# Patient Record
Sex: FEMALE
[De-identification: names, ages, dates, MRNs, and addresses within clinical notes are randomized per-mention and may not be internally consistent; named-entity substitution may affect disease eponyms.]

---

## 2019-07-23 ENCOUNTER — HOSPITAL ENCOUNTER (EMERGENCY)
Dept: HOSPITAL 18 - NAV ERS | Age: 39
Discharge: ANOTHER HOSPITAL | End: 2019-07-23
Payer: SELF-PAY

## 2019-07-23 ENCOUNTER — HOSPITAL ENCOUNTER (INPATIENT)
Dept: HOSPITAL 92 - ERS | Age: 39
LOS: 5 days | Discharge: HOME | DRG: 770 | End: 2019-07-28
Attending: OBSTETRICS & GYNECOLOGY | Admitting: OBSTETRICS & GYNECOLOGY
Payer: SELF-PAY

## 2019-07-23 VITALS — BODY MASS INDEX: 23.8 KG/M2

## 2019-07-23 DIAGNOSIS — D64.9: ICD-10-CM

## 2019-07-23 DIAGNOSIS — E83.42: ICD-10-CM

## 2019-07-23 DIAGNOSIS — O98.811: Primary | ICD-10-CM

## 2019-07-23 DIAGNOSIS — R65.21: ICD-10-CM

## 2019-07-23 DIAGNOSIS — A41.9: ICD-10-CM

## 2019-07-23 DIAGNOSIS — Z3A.01: ICD-10-CM

## 2019-07-23 DIAGNOSIS — O99.89: ICD-10-CM

## 2019-07-23 DIAGNOSIS — O07.37: Primary | ICD-10-CM

## 2019-07-23 DIAGNOSIS — R94.31: ICD-10-CM

## 2019-07-23 DIAGNOSIS — E87.6: ICD-10-CM

## 2019-07-23 DIAGNOSIS — R10.9: ICD-10-CM

## 2019-07-23 DIAGNOSIS — J96.01: ICD-10-CM

## 2019-07-23 LAB
ALBUMIN SERPL BCG-MCNC: 4.4 G/DL (ref 3.5–5)
ALP SERPL-CCNC: 67 U/L (ref 40–150)
ALT SERPL W P-5'-P-CCNC: 20 U/L (ref 8–55)
ANALYZER IN CARDIO: (no result)
ANION GAP SERPL CALC-SCNC: 23 MMOL/L (ref 10–20)
APAP SERPL-MCNC: (no result) MCG/ML (ref 10–30)
APTT PPP: 38.3 SEC (ref 22.9–36.1)
APTT PPP: 38.6 SEC (ref 22.9–36.1)
AST SERPL-CCNC: 24 U/L (ref 5–34)
BACTERIA UR QL AUTO: (no result) HPF
BASE EXCESS STD BLDA CALC-SCNC: -12.8 MEQ/L
BASOPHILS # BLD AUTO: 0 THOU/UL (ref 0–0.2)
BASOPHILS # BLD AUTO: 0 THOU/UL (ref 0–0.2)
BASOPHILS NFR BLD AUTO: 0.3 % (ref 0–1)
BASOPHILS NFR BLD AUTO: 1 % (ref 0–1)
BILIRUB SERPL-MCNC: 0.7 MG/DL (ref 0.2–1.2)
BUN SERPL-MCNC: 5 MG/DL (ref 7–18.7)
CA-I BLDA-SCNC: 1.05 MMOL/L (ref 1.12–1.3)
CALCIUM SERPL-MCNC: 9.4 MG/DL (ref 7.8–10.44)
CHLORIDE SERPL-SCNC: 104 MMOL/L (ref 98–107)
CK SERPL-CCNC: 56 U/L (ref 29–168)
CO2 SERPL-SCNC: 13 MMOL/L (ref 22–29)
CREAT CL PREDICTED SERPL C-G-VRATE: 0 ML/MIN (ref 70–130)
DRUG SCREEN CUTOFF: (no result)
EOSINOPHIL # BLD AUTO: 0 THOU/UL (ref 0–0.7)
EOSINOPHIL # BLD AUTO: 0 THOU/UL (ref 0–0.7)
EOSINOPHIL NFR BLD AUTO: 0.1 % (ref 0–10)
EOSINOPHIL NFR BLD AUTO: 0.1 % (ref 0–10)
GLOBULIN SER CALC-MCNC: 4.1 G/DL (ref 2.4–3.5)
GLUCOSE SERPL-MCNC: 105 MG/DL (ref 70–105)
HCG, TOTAL QUANT: (no result) MIU/ML
HCO3 BLDA-SCNC: 13.8 MEQ/L (ref 22–28)
HCT VFR BLDA CALC: 28 % (ref 36–47)
HGB BLD-MCNC: 11.3 G/DL (ref 12–16)
HGB BLD-MCNC: 8.5 G/DL (ref 12–16)
HGB BLDA-MCNC: 9.6 G/DL (ref 12–16)
INR PPP: 1.5
INR PPP: 1.5
LYMPHOCYTES # BLD AUTO: 1 THOU/UL (ref 1.2–3.4)
LYMPHOCYTES # BLD: 0.3 THOU/UL (ref 1.2–3.4)
LYMPHOCYTES NFR BLD AUTO: 29.5 % (ref 21–51)
LYMPHOCYTES NFR BLD AUTO: 7.3 % (ref 21–51)
MCH RBC QN AUTO: 26.7 PG (ref 27–31)
MCH RBC QN AUTO: 28.1 PG (ref 27–31)
MCV RBC AUTO: 88.1 FL (ref 78–98)
MCV RBC AUTO: 88.6 FL (ref 78–98)
MDIFF COMPLETE?: YES
MEDTOX CONTROL LINE VALID?: (no result)
MONOCYTES # BLD AUTO: 0 THOU/UL (ref 0.11–0.59)
MONOCYTES # BLD AUTO: 0.1 THOU/UL (ref 0.11–0.59)
MONOCYTES NFR BLD AUTO: 1.3 % (ref 0–10)
MONOCYTES NFR BLD AUTO: 1.9 % (ref 0–10)
NEUTROPHILS # BLD AUTO: 2.3 THOU/UL (ref 1.4–6.5)
NEUTROPHILS # BLD AUTO: 4.1 THOU/UL (ref 1.4–6.5)
NEUTROPHILS NFR BLD AUTO: 68.1 % (ref 42–75)
NEUTROPHILS NFR BLD AUTO: 90.5 % (ref 42–75)
PCO2 BLDA: 34.2 MMHG (ref 35–45)
PH BLDA: 7.22 [PH] (ref 7.35–7.45)
PLATELET # BLD AUTO: 115 THOU/UL (ref 130–400)
PLATELET # BLD AUTO: 136 THOU/UL (ref 130–400)
PO2 BLDA: 103.8 MMHG (ref 80–100)
POLYCHROMASIA BLD QL SMEAR: (no result) (100X)
POTASSIUM BLD-SCNC: 2.67 MMOL/L (ref 3.7–5.3)
POTASSIUM SERPL-SCNC: 4.3 MMOL/L (ref 3.5–5.1)
PROT UR STRIP.AUTO-MCNC: (no result) MG/DL
PROTHROMBIN TIME: 17.9 SEC (ref 12–14.7)
PROTHROMBIN TIME: 18 SEC (ref 12–14.7)
RBC # BLD AUTO: 3.01 MILL/UL (ref 4.2–5.4)
RBC # BLD AUTO: 4.25 MILL/UL (ref 4.2–5.4)
SALICYLATES SERPL-MCNC: (no result) MG/DL (ref 15–30)
SODIUM SERPL-SCNC: 136 MMOL/L (ref 136–145)
SPECIMEN DRAWN FROM PATIENT: (no result)
TSH SERPL DL<=0.005 MIU/L-ACNC: 0.55 UIU/ML (ref 0.35–4.94)
WBC # BLD AUTO: 3.4 THOU/UL (ref 4.8–10.8)
WBC # BLD AUTO: 4.5 THOU/UL (ref 4.8–10.8)

## 2019-07-23 PROCEDURE — 86850 RBC ANTIBODY SCREEN: CPT

## 2019-07-23 PROCEDURE — S0028 INJECTION, FAMOTIDINE, 20 MG: HCPCS

## 2019-07-23 PROCEDURE — 80307 DRUG TEST PRSMV CHEM ANLYZR: CPT

## 2019-07-23 PROCEDURE — 86901 BLOOD TYPING SEROLOGIC RH(D): CPT

## 2019-07-23 PROCEDURE — 71045 X-RAY EXAM CHEST 1 VIEW: CPT

## 2019-07-23 PROCEDURE — 86900 BLOOD TYPING SEROLOGIC ABO: CPT

## 2019-07-23 PROCEDURE — 87040 BLOOD CULTURE FOR BACTERIA: CPT

## 2019-07-23 PROCEDURE — 96361 HYDRATE IV INFUSION ADD-ON: CPT

## 2019-07-23 PROCEDURE — 85730 THROMBOPLASTIN TIME PARTIAL: CPT

## 2019-07-23 PROCEDURE — 84702 CHORIONIC GONADOTROPIN TEST: CPT

## 2019-07-23 PROCEDURE — 81003 URINALYSIS AUTO W/O SCOPE: CPT

## 2019-07-23 PROCEDURE — 31500 INSERT EMERGENCY AIRWAY: CPT

## 2019-07-23 PROCEDURE — 82805 BLOOD GASES W/O2 SATURATION: CPT

## 2019-07-23 PROCEDURE — 96360 HYDRATION IV INFUSION INIT: CPT

## 2019-07-23 PROCEDURE — 80306 DRUG TEST PRSMV INSTRMNT: CPT

## 2019-07-23 PROCEDURE — 80053 COMPREHEN METABOLIC PANEL: CPT

## 2019-07-23 PROCEDURE — 36415 COLL VENOUS BLD VENIPUNCTURE: CPT

## 2019-07-23 PROCEDURE — 83605 ASSAY OF LACTIC ACID: CPT

## 2019-07-23 PROCEDURE — 84484 ASSAY OF TROPONIN QUANT: CPT

## 2019-07-23 PROCEDURE — 94003 VENT MGMT INPAT SUBQ DAY: CPT

## 2019-07-23 PROCEDURE — 85025 COMPLETE CBC W/AUTO DIFF WBC: CPT

## 2019-07-23 PROCEDURE — 81015 MICROSCOPIC EXAM OF URINE: CPT

## 2019-07-23 PROCEDURE — 85610 PROTHROMBIN TIME: CPT

## 2019-07-23 PROCEDURE — 93005 ELECTROCARDIOGRAM TRACING: CPT

## 2019-07-23 PROCEDURE — 96365 THER/PROPH/DIAG IV INF INIT: CPT

## 2019-07-23 PROCEDURE — 82550 ASSAY OF CK (CPK): CPT

## 2019-07-23 PROCEDURE — 87086 URINE CULTURE/COLONY COUNT: CPT

## 2019-07-23 PROCEDURE — 94760 N-INVAS EAR/PLS OXIMETRY 1: CPT

## 2019-07-23 PROCEDURE — 88305 TISSUE EXAM BY PATHOLOGIST: CPT

## 2019-07-23 PROCEDURE — 83735 ASSAY OF MAGNESIUM: CPT

## 2019-07-23 PROCEDURE — 80048 BASIC METABOLIC PNL TOTAL CA: CPT

## 2019-07-23 PROCEDURE — 36556 INSERT NON-TUNNEL CV CATH: CPT

## 2019-07-23 PROCEDURE — 84443 ASSAY THYROID STIM HORMONE: CPT

## 2019-07-23 PROCEDURE — 87149 DNA/RNA DIRECT PROBE: CPT

## 2019-07-23 PROCEDURE — 94002 VENT MGMT INPAT INIT DAY: CPT

## 2019-07-23 PROCEDURE — 81001 URINALYSIS AUTO W/SCOPE: CPT

## 2019-07-23 PROCEDURE — 10D17ZZ EXTRACTION OF PRODUCTS OF CONCEPTION, RETAINED, VIA NATURAL OR ARTIFICIAL OPENING: ICD-10-PCS | Performed by: OBSTETRICS & GYNECOLOGY

## 2019-07-23 NOTE — PDOC.EVN
Event Note





- Event Note


Event Note: 





Passed by ICU to see Ms Hyde about 1-2hrs after post surgery. Pt stable on 

ivf. BP 130s systolic. Pulse 90s. Explained to family pt may be extubated in 

the morning if she continues to improve.  I have discussed case with Dr Davies 

who is managing her care tonight.  Thanks for the help. 





addendum


7/24/19 0751


PT did well overnight and has remained stable only with mainenance iv fluids 

and abx. Pt is still intubated. SEdation decreased and pt able to communicate.  

She has confirmed family does not know she was pregnant and does not want us to 

share that with them.  





vital signs reviewed and stable and wnl





a/p


septic AB- on Zosyn, BC pending. Clinically looking much better since 

presentation.  likely extubation and transfer to floor today. DR Davies 

managing vent.

## 2019-07-23 NOTE — RAD
EXAM: Portable supine chest



INDICATIONS: Postintubation



COMPARISON: None.



FINDINGS: ET tube is above stanton and appears adequately positioned. NG tube appears in adequate posi
tion. Central line has tip overlying right atrium.

Borderline cardiomegaly and mild vascular congestion. No consolidation or focal infiltrate.



IMPRESSION: Tubes and lines appear adequately positioned. Mild vascular congestion.



Reported By: Garrett Haddad 

Electronically Signed:  7/23/2019 7:48 PM

## 2019-07-23 NOTE — OP
DATE OF PROCEDURE:  2019



PREOPERATIVE DIAGNOSES:  

1. Septic .

2. IUP at about 6 weeks gestation.



POSTOPERATIVE DIAGNOSES:  

1. Septic .

2. IUP at about 6 weeks gestation.



PROCEDURE PERFORMED:  Suction D and C.



ANESTHESIA:  General.



COMPLICATIONS:  None.



CONDITION:  Stable.



COUNT:  Correct.



SPECIMENS:  Products of conception.



ESTIMATED BLOOD LOSS:  About 100 mL.



FINDINGS:  Uterine sound at 11 cm, generous return with suction D and C and good

hemostasis at conclusion of procedure. 



DESCRIPTION OF PROCEDURE:  The patient was taken to the operating room already

intubated and central line in place, which was performed in the emergency room given

the patient's overall status at that time.  She was then prepared and draped in

normal sterile fashion in a dorsal lithotomy position in candy-cane stirrups.

Attention was placed vaginally, where with an operative speculum, the cervix was

identified and grasped with a single-tooth tenaculum and the uterus was sounded to

11 cm.  Cervix was dilated to accommodate a 7-Maori curved curette.  After about 3

or 4 passes with generous return, the serrated sharp curette was then passed noting

uterine cry in all 4 quadrants.  A suction curette was then passed one more time to

remove all remaining debris.  The patient did have some bleeding which resolved with

bimanual pressure.  The tenaculum sites were made hemostatic with sponge stick.

With the conclusion of the procedure, the patient was taken out of lithotomy

position and then transferred to the ICU for immediate postoperative care.

Anticipate extubation in the morning. 







Job ID:  [id number]

## 2019-07-24 LAB
ANION GAP SERPL CALC-SCNC: 9 MMOL/L (ref 10–20)
BASOPHILS # BLD AUTO: 0.1 THOU/UL (ref 0–0.2)
BASOPHILS NFR BLD AUTO: 1 % (ref 0–1)
BUN SERPL-MCNC: 4 MG/DL (ref 7–18.7)
CALCIUM SERPL-MCNC: 7.4 MG/DL (ref 7.8–10.44)
CHLORIDE SERPL-SCNC: 112 MMOL/L (ref 98–107)
CO2 SERPL-SCNC: 17 MMOL/L (ref 22–29)
CREAT CL PREDICTED SERPL C-G-VRATE: 142 ML/MIN (ref 70–130)
EOSINOPHIL # BLD AUTO: 0 THOU/UL (ref 0–0.7)
EOSINOPHIL NFR BLD AUTO: 0.3 % (ref 0–10)
GLUCOSE SERPL-MCNC: 99 MG/DL (ref 70–105)
HGB BLD-MCNC: 8.4 G/DL (ref 12–16)
LYMPHOCYTES # BLD: 1.7 THOU/UL (ref 1.2–3.4)
LYMPHOCYTES NFR BLD AUTO: 23.7 % (ref 21–51)
MAGNESIUM SERPL-MCNC: 1.6 MG/DL (ref 1.6–2.6)
MCH RBC QN AUTO: 28.8 PG (ref 27–31)
MCV RBC AUTO: 87 FL (ref 78–98)
MONOCYTES # BLD AUTO: 0.6 THOU/UL (ref 0.11–0.59)
MONOCYTES NFR BLD AUTO: 9.2 % (ref 0–10)
NEUTROPHILS # BLD AUTO: 4.6 THOU/UL (ref 1.4–6.5)
NEUTROPHILS NFR BLD AUTO: 65.8 % (ref 42–75)
PLATELET # BLD AUTO: 102 THOU/UL (ref 130–400)
POTASSIUM SERPL-SCNC: 2.5 MMOL/L (ref 3.5–5.1)
RBC # BLD AUTO: 2.93 MILL/UL (ref 4.2–5.4)
SODIUM SERPL-SCNC: 135 MMOL/L (ref 136–145)
WBC # BLD AUTO: 7 THOU/UL (ref 4.8–10.8)
WBC UR QL AUTO: (no result) HPF (ref 0–3)

## 2019-07-24 RX ADMIN — HEPARIN SODIUM SCH: 5000 INJECTION, SOLUTION INTRAVENOUS; SUBCUTANEOUS at 19:44

## 2019-07-24 NOTE — CON
DATE OF CONSULTATION:  2019



SERVICE:  Pulmonary/Critical Care.



REASON FOR CONSULTATION:  ICU patient.



HISTORY OF PRESENT ILLNESS:  The patient is a 39-year-old  female with 
past

medical history significant for pregnancy that she did not want to keep.

Apparently, the story goes that she attempted to give herself an .  She

became septic.  As such, she was transferred from one Emergency Department to

another Emergency Department for an emergency D and C procedure.  She was

hypotensive on presentation, got 4 L of fluid.  A central line was placed, she 
was

initiated on pressors.  Overnight, she cleared her lactate.  This morning, she 
is

breathing comfortably.  She cannot provide any additional elements of the 
history

because the tube is currently in her throat.  We know very little history about 
her. 



PAST MEDICAL HISTORY:  None.



PAST SURGICAL HISTORY:  

1.  section.

2. D and C.



SOCIAL HISTORY:  Unknown.



FAMILY HISTORY:  Noncontributory.



ALLERGIES:  NO KNOWN DRUG ALLERGIES.



MEDICATIONS:  List of her inpatient medications was reviewed and updated.



REVIEW OF SYSTEMS:  This cannot be obtained as the patient is currently 
intubated on

the influence of some sedation. 



PHYSICAL EXAMINATION:

VITAL SIGNS:  Afebrile, pulse 85, blood pressure 87/60, respirations 20, and

saturation 99% on 21% FiO2 and a PEEP of 5. 

HEENT:  Normocephalic and atraumatic.  Sclerae white.  Conjunctivae pink.  Oral

mucosa is moist without lesions. 

LUNGS:  Decent air entry.  No prolonged expiratory phase or wheezing is 
appreciated. 

HEART:  Normal rate.  Regular. 

ABDOMEN:  Soft, nontender, and nondistended.  Bowel sounds positive. 

MUSCULOSKELETAL:  No cyanosis or clubbing.  There is no pitting in the bilateral

lower extremities. 

NEUROLOGIC:  Nonfocal.



LABORATORY DATA:  WBC 4.5, hemoglobin 8.5, and platelets 115,000.  INR 1.5.  A 
pH of

7.22, pCO2 of 34, and pO2 of 104.  Lactate had improved to 0.8.  Blood cultures 
x2

are unremarkable. 



ASSESSMENT:  

1. Septic shock.

2. Septic , status post D and C, postop day #1.



DISCUSSION AND PLAN:  The patient will be placed on a spontaneous breathing 
trial

after long sedation holiday.  If she meets criteria, extubation will be 
considered.

Her ongoing acidosis is likely a function of her aggressive volume resuscitation
,

which she needed.  At this point; however, she does not need any IV fluids.  If 
we

extubate her, we will get her out of bed into a chair, and offer her a diet.  I 
will

repeat some laboratories tomorrow morning to make certain that her bicarb 
returns to

the normal range.  Pulmonary/Critical Care will follow along for the time 
being. 



Critical care time: 30 minutes.  



Job ID:  [id number]



MTDD

## 2019-07-25 LAB
ANION GAP SERPL CALC-SCNC: 8 MMOL/L (ref 10–20)
ANION GAP SERPL CALC-SCNC: 8 MMOL/L (ref 10–20)
BASOPHILS # BLD AUTO: 0.1 THOU/UL (ref 0–0.2)
BASOPHILS NFR BLD AUTO: 0.5 % (ref 0–1)
BUN SERPL-MCNC: (no result) MG/DL (ref 7–18.7)
CALCIUM SERPL-MCNC: 7.9 MG/DL (ref 7.8–10.44)
CHLORIDE SERPL-SCNC: 111 MMOL/L (ref 98–107)
CHLORIDE SERPL-SCNC: 112 MMOL/L (ref 98–107)
CO2 SERPL-SCNC: 19 MMOL/L (ref 22–29)
CO2 SERPL-SCNC: 19 MMOL/L (ref 22–29)
CREAT CL PREDICTED SERPL C-G-VRATE: 140 ML/MIN (ref 70–130)
EOSINOPHIL # BLD AUTO: 0 THOU/UL (ref 0–0.7)
EOSINOPHIL NFR BLD AUTO: 0.3 % (ref 0–10)
GLUCOSE SERPL-MCNC: 100 MG/DL (ref 70–105)
HGB BLD-MCNC: 8.8 G/DL (ref 12–16)
LYMPHOCYTES # BLD: 1.3 THOU/UL (ref 1.2–3.4)
LYMPHOCYTES NFR BLD AUTO: 13 % (ref 21–51)
MCH RBC QN AUTO: 29.1 PG (ref 27–31)
MCV RBC AUTO: 86 FL (ref 78–98)
MONOCYTES # BLD AUTO: 0.6 THOU/UL (ref 0.11–0.59)
MONOCYTES NFR BLD AUTO: 5.7 % (ref 0–10)
NEUTROPHILS # BLD AUTO: 8.2 THOU/UL (ref 1.4–6.5)
NEUTROPHILS NFR BLD AUTO: 80.5 % (ref 42–75)
PLATELET # BLD AUTO: 112 THOU/UL (ref 130–400)
POTASSIUM SERPL-SCNC: 2.8 MMOL/L (ref 3.5–5.1)
POTASSIUM SERPL-SCNC: 2.9 MMOL/L (ref 3.5–5.1)
RBC # BLD AUTO: 3.04 MILL/UL (ref 4.2–5.4)
SODIUM SERPL-SCNC: 135 MMOL/L (ref 136–145)
SODIUM SERPL-SCNC: 136 MMOL/L (ref 136–145)
WBC # BLD AUTO: 10.1 THOU/UL (ref 4.8–10.8)

## 2019-07-25 RX ADMIN — HEPARIN SODIUM SCH UNITS: 5000 INJECTION, SOLUTION INTRAVENOUS; SUBCUTANEOUS at 20:56

## 2019-07-25 RX ADMIN — HEPARIN SODIUM SCH UNITS: 5000 INJECTION, SOLUTION INTRAVENOUS; SUBCUTANEOUS at 08:08

## 2019-07-25 RX ADMIN — FAMOTIDINE SCH MG: 10 INJECTION, SOLUTION INTRAVENOUS at 08:08

## 2019-07-25 RX ADMIN — FAMOTIDINE SCH MG: 10 INJECTION, SOLUTION INTRAVENOUS at 20:56

## 2019-07-25 NOTE — CON
DATE OF CONSULTATION:  2019



REFERRING PHYSICIAN:  Dr. Davies



REASON FOR CONSULTATION:  Medical management.



HISTORY OF PRESENT ILLNESS:  Ms. Hyde is a 39-year-old woman, who presented 
to

the emergency department on 2019 as a transfer from [place] due to 
sepsis

following an attempt on self  via insertion of surgical tubing and 
drinking

marked amount of castor oil.  The patient was approximately 6 weeks pregnant.  
She

presented to [place] with a temperature of 104.4.  The patient was noted to be

hypotensive with a systolic blood pressure in the 90s.  Per flight crew report, 
she

had a saturation of 90% on 2 L and 94% on room air.  She was given IV 
antibiotics

including Zosyn and Flagyl as well as IV fluids.  On arrival to the emergency

department here, the patient had been complaining of abdominal pain.  The 
patient

ended up being intubated while in the emergency department and was admitted to 
the

CCU with septic shock.  She was seen by Dr. Chaves, who performed a D and C.  
She

remained stable on IV fluids and her blood pressure improved to the 130s.  The

patient was extubated yesterday.  Per Dr. Chaves, she felt to be improving and

remained for extubation and transfer to the floor today.  The patient was being

managed by Dr. Davies, who placed the patient on a spontaneous breathing trial

after a long sedation.  He felt her ongoing acidosis was likely due to 
aggressive

volume repletion.  IV fluids were no longer deemed necessary, therefore, those 
have

been discontinued.  He continues to follow.  The patient has now been 
transferred to

the floor and is doing well.  She denies any complaints at this present time.

Denies any pain.  Denies any chest pain or difficulty breathing.  Has not

experienced any vaginal bleeding.  She has tolerated fluid intake, but has had

minimal food intake due to lack of appetite.  The patient states she has been

mobilizing to the toilet, but feeling lightheaded. 



No laboratory studies done since yesterday.  The patient remains afebrile.  Her

blood pressure is in the high 80s to 90s. 



PAST MEDICAL HISTORY:  None.



PAST SURGICAL HISTORY:  

1. Previous .

2. Status post D and C due to septic .



SOCIAL HISTORY:  Denies any tobacco use or drug use.  No alcohol use.



ALLERGIES:  NONE.



CURRENT MEDICATIONS:  None.



PHYSICAL EXAMINATION:

GENERAL:  The patient appears well developed, well nourished, is in no acute

distress. 

VITAL SIGNS:  Temperature 98.4, pulse 91, respirations 16, O2 saturation 100 on 
room

air, blood pressure 97/55.  HEENT:  Normocephalic and atraumatic.  Pupils are 
equal,

round, and reactive to light.  Sclerae icterus.  Oropharynx is clear. 

NECK:  Supple. 

LUNGS:  Clear to auscultation bilaterally. 

CARDIAC:  Regular rate and rhythm. 

ABDOMEN:  Soft, nontender, nondistended.  No guarding or rigidity. 

EXTREMITIES:  No lower leg swelling or edema.  Mechanical SCDs in place. 

NEUROLOGIC:  Alert and oriented x3. 

SKIN:  The patient appears pale, but skin is warm and dry.



IMPRESSION AND PLAN:  Ms. Hyde is a 39-year-old woman, who is admitted with

septic shock following self attempted incomplete .  She is status post

extubation and cleared for transfer to the floor.  She remains hypotensive and

symptomatic.  We will resume IV fluids and we will also obtain CBC and BMP as 
well

as lactic acid.  We will request orthostatic blood pressure and urinalysis.  The

patient has no medical comorbidities.  She is currently without any complaints.
  She

was encouraged to increase her oral intake as tolerated.  We will check 
orthostatic

blood pressures.  Last hemoglobin checked yesterday was 8.5, we will transfuse 
if

needed. 



Case discussed with attending, who agrees with plan of care as described above.







Job ID:  [id number]



MTDD

## 2019-07-25 NOTE — PDOC.EVN
Event Note





- Event Note


Event Note: 


POD #2 s/p emergent D&C for septic . Pt extubated and stable out of ICU 

now for one day.  


Pt reports she is feeling okay. She is very grateful for everyones care.  About 

to get up to shower. denies pain. 


vital signs 99.7 tmax 101.4 this am 0400.  bp 90/57  p 98  rr 20 


PE: NAD


abdomen soft NTTP





k. 2.8


mg 1.6





A/P 


1/ septic  s/p D&C stable on zosyn.  bcx neg to date.  febrile in the 

last 24hrs. will continue abx


2. hypo K- PT has recieved 80meq today by IM . Will order another 40meq now and 

and other 40 in the AM. with meal .  will recheck in morning


3. hypo mag. 2gm supplemented by Dr Davies. Will check in AM


4. At pt request family has not been informed of the nature of her illness.


Continue in house care and reassess in the next couple of days.


Regular diet


DVT prophylaxis scd'd for 


GI prophylaxis pepcid








addendum labs this am k 3.5 mag 2.2

## 2019-07-25 NOTE — PRG
DATE OF SERVICE:  07/25/2019



SERVICE:  Pulmonary Medicine.



INTERVAL HISTORY:  The patient is doing fine from respiratory standpoint.  She is

breathing comfortably.  She has no complaints of nausea, vomiting, or diarrhea.  She

has some marginal blood pressures last night, got an additional liter of fluid.

That being said, she was asymptomatic to it, never had mentation issues, urine

output remained excellent all night. 



PHYSICAL EXAMINATION:

VITAL SIGNS:  Currently, afebrile with a T-max of 101.4 overnight.  Pulse 102, blood

pressure 98/67, respirations 16, saturation 96% on room air. 

GENERAL:  The patient is awake and alert, in no apparent distress. 

LUNGS:  There are good air entry with no prolonged expiratory phase.  No wheezing or

crackles are appreciated. 

HEART:  Normal rate, regular. 

ABDOMEN:  Soft, nontender, nondistended. Bowel sounds are positive. 

MUSCULOSKELETAL:  No cyanosis or clubbing.  No pitting in the bilateral lower

extremities. 

NEUROLOGIC:  Nonfocal.



LABORATORY DATA:  WBC 10.1, hemoglobin 8.8, platelets 112,000.  Potassium 2.8,

magnesium 1.6.  Otherwise, basic metabolic profile is unremarkable.  Blood cultures

x2 and urine culture negative to date. 



ASSESSMENT:  

1. Septic shock, resolved.

2. Hypokalemia.

3. Hypomagnesemia.



DISCUSSION AND PLAN:  Her potassium and magnesium will be replaced.  At this point,

she has no further requirements for inpatient Pulmonary Critical Care opinion, and I

will sign off.  Please call with additional questions or concerns through time. 







Job ID:  [id number]

## 2019-07-25 NOTE — PDOC.HOSPP
- Subjective


Subjective: 





f/u for septic  on current Zosyn. Fever spike this am but pt states she 

feels better overall. 





- Objective


Vital Signs & Weight: 


 Vital Signs (12 hours)











  Temp Pulse Resp BP BP BP Pulse Ox


 


 19 12:00  99.4 F  102 H  16  98/67    96


 


 19 09:00    24 H   96/63  


 


 19 08:13  99.0 F  95  14    104/69  98


 


 19 08:00        98


 


 19 06:18  100.5 F H  102 H  22 H  102/62    95


 


 19 04:00  101.4 F H  109 H  18  97/61    93 L








 Weight











Weight                         143 lb 4.807 oz











 Most Recent Monitor Data











Heart Rate from ECG            85


 


NIBP                           87/60


 


NIBP BP-Mean                   69


 


Respiration from ECG           20


 


SpO2                           99














I&O: 


 











 19





 06:59 06:59 06:59


 


Intake Total 698.8 489.6 


 


Output Total 870 350 


 


Balance -171.2 139.6 











Result Diagrams: 


 19 05:30





 19 05:30


Additional Labs: 





Microbiology





19 23:21   Urine clean catch   Urine Culture - Preliminary


                              NO GROWTH AT 12 HOURS


19 20:52   Venous blood - Left Hand   Blood Culture - Preliminary


                              NO GROWTH AT 48 HOURS


19 20:42   Central Line - Right Internal Jugular vein   Blood Culture - 

Preliminary


                              NO GROWTH AT 48 HOURS





 Laboratory Tests











  19





  19:06 18:54 18:54


 


Hgb  8.5 L   8.4 L


 


Potassium   2.5 L* 


 


Carbon Dioxide   17 L 














  19





  01:15


 


Hgb 


 


Potassium  2.9 L*


 


Carbon Dioxide  19 L














ROS





- Review of Systems


All systems: 


All other ROS were reviewed and found negative.








- Medication


Medications: 


Active Medications











Generic Name Dose Route Start Last Admin





  Trade Name Freq  PRN Reason Stop Dose Admin


 


Acetaminophen  650 mg  19 17:49  19 13:22





  Tylenol  PO   650 mg





  Q4H PRN   Administration





  Fever > 101   





     





     





     


 


Famotidine  20 mg  19 09:00  19 08:08





  Pepcid  SLOW IVP   20 mg





  Q12HR DESMOND   Administration





     





     





     





     


 


Heparin Sodium (Porcine)  5,000 units  19 21:00  19 08:08





  Heparin  SC   5,000 units





  BID DESMOND   Administration





     





     





     





     


 


Piperacillin Sod/Tazobactam  100 mls @ 200 mls/hr  19 06:00  19 13:

20





  Sod 3.375 gm/ Sodium Chloride  IVPB   100 mls





  Q6HR DESMOND   Administration





     





     





     





     


 


Sodium Chloride  1,000 mls @ 125 mls/hr  19 10:00  19 09:56





  Normal Saline 0.9%  IV   1,000 mls





  INF DESMOND   Administration





     





     





     





     














- Exam


NAD, awake alert


Eye: PERRL, anicteric sclera


ENT: normocephalic atraumatic, no oropharyngeal lesions


Neck: supple, symmetric, no JVD, no Thyromegaly


Heart: RRR, no murmur, no gallops, no rubs


Respiratory: CTAB, no wheezes, no rales, no ronchi


Gastrointestinal: soft, non-tender, non-distended, normal bowel sounds


Extremities: no cyanosis, no clubbing, no edema


Neurological: CN's grossly intact, no new deficit


Psychiatric: normal behavior, A&O x 3





Hosp A/P


(1)  with septicemia


Code(s): O03.87 - SEPSIS FOLLOWING COMPLETE OR UNSP SPONTANEOUS    

Status: Acute   


Plan: 


s/p I&D, continue IVF's, continue Zosyn, initial blood cx negative x 2








(2) Hypokalemia


Code(s): E87.6 - HYPOKALEMIA   Status: Acute   


Plan: 


KCL replacement, serial K+ monitoring








(3) Normocytic anemia


Code(s): D64.9 - ANEMIA, UNSPECIFIED   Status: Acute   


Plan: 


Stable currently, no transfusion warranted, serial H/H








(4) Acute respiratory failure with hypoxia


Code(s): J96.01 - ACUTE RESPIRATORY FAILURE WITH HYPOXIA   Status: Acute   


Plan: 


s/p intubation, tolerating RA currently








- Plan


continue antibiotics, , out of bed/ambulate, DVT proph w/SCDs





Stable currently


Monitor temperature trend


Continue Zosyn


Continue IVF's


AM lab: BMP, CBC, Mg++

## 2019-07-26 LAB
ANION GAP SERPL CALC-SCNC: 10 MMOL/L (ref 10–20)
BASOPHILS # BLD AUTO: 0 THOU/UL (ref 0–0.2)
BASOPHILS NFR BLD AUTO: 0.4 % (ref 0–1)
BUN SERPL-MCNC: 6 MG/DL (ref 7–18.7)
CALCIUM SERPL-MCNC: 8.1 MG/DL (ref 7.8–10.44)
CHLORIDE SERPL-SCNC: 109 MMOL/L (ref 98–107)
CO2 SERPL-SCNC: 19 MMOL/L (ref 22–29)
CREAT CL PREDICTED SERPL C-G-VRATE: 135 ML/MIN (ref 70–130)
EOSINOPHIL # BLD AUTO: 0.1 THOU/UL (ref 0–0.7)
EOSINOPHIL NFR BLD AUTO: 1 % (ref 0–10)
GLUCOSE SERPL-MCNC: 91 MG/DL (ref 70–105)
HGB BLD-MCNC: 7.7 G/DL (ref 12–16)
LYMPHOCYTES # BLD: 1.9 THOU/UL (ref 1.2–3.4)
LYMPHOCYTES NFR BLD AUTO: 27.4 % (ref 21–51)
MAGNESIUM SERPL-MCNC: 2.2 MG/DL (ref 1.6–2.6)
MCH RBC QN AUTO: 29.1 PG (ref 27–31)
MCV RBC AUTO: 86.7 FL (ref 78–98)
MONOCYTES # BLD AUTO: 0.5 THOU/UL (ref 0.11–0.59)
MONOCYTES NFR BLD AUTO: 7.9 % (ref 0–10)
NEUTROPHILS # BLD AUTO: 4.3 THOU/UL (ref 1.4–6.5)
NEUTROPHILS NFR BLD AUTO: 63.3 % (ref 42–75)
PLATELET # BLD AUTO: 124 THOU/UL (ref 130–400)
POTASSIUM SERPL-SCNC: 3.5 MMOL/L (ref 3.5–5.1)
RBC # BLD AUTO: 2.65 MILL/UL (ref 4.2–5.4)
SODIUM SERPL-SCNC: 134 MMOL/L (ref 136–145)
WBC # BLD AUTO: 6.8 THOU/UL (ref 4.8–10.8)

## 2019-07-26 RX ADMIN — HEPARIN SODIUM SCH UNITS: 5000 INJECTION, SOLUTION INTRAVENOUS; SUBCUTANEOUS at 08:38

## 2019-07-26 RX ADMIN — FAMOTIDINE SCH MG: 10 INJECTION, SOLUTION INTRAVENOUS at 20:04

## 2019-07-26 RX ADMIN — HEPARIN SODIUM SCH UNITS: 5000 INJECTION, SOLUTION INTRAVENOUS; SUBCUTANEOUS at 20:04

## 2019-07-26 RX ADMIN — FAMOTIDINE SCH MG: 10 INJECTION, SOLUTION INTRAVENOUS at 08:38

## 2019-07-26 NOTE — PDOC.EVN
Event Note





- Event Note


Event Note: 


Report given to me by Dr. Chaves.


POD#3 s/p D&C for septic incomplete AB.


Remains on Zosyn.


T to 101.4 last PM.


VSS 


AF this AM.


Abdomen is soft and NT.


Lochia is small.


Plan: Will cont. ABX until 48 hrs. AF.

## 2019-07-27 LAB
ANION GAP SERPL CALC-SCNC: 12 MMOL/L (ref 10–20)
BUN SERPL-MCNC: 6 MG/DL (ref 7–18.7)
CALCIUM SERPL-MCNC: 8.2 MG/DL (ref 7.8–10.44)
CHLORIDE SERPL-SCNC: 109 MMOL/L (ref 98–107)
CO2 SERPL-SCNC: 18 MMOL/L (ref 22–29)
CREAT CL PREDICTED SERPL C-G-VRATE: 140 ML/MIN (ref 70–130)
GLUCOSE SERPL-MCNC: 81 MG/DL (ref 70–105)
MAGNESIUM SERPL-MCNC: 2.1 MG/DL (ref 1.6–2.6)
POTASSIUM SERPL-SCNC: 3.5 MMOL/L (ref 3.5–5.1)
SODIUM SERPL-SCNC: 135 MMOL/L (ref 136–145)

## 2019-07-27 RX ADMIN — FAMOTIDINE SCH MG: 10 INJECTION, SOLUTION INTRAVENOUS at 20:09

## 2019-07-27 RX ADMIN — HEPARIN SODIUM SCH UNITS: 5000 INJECTION, SOLUTION INTRAVENOUS; SUBCUTANEOUS at 07:55

## 2019-07-27 RX ADMIN — FAMOTIDINE SCH MG: 10 INJECTION, SOLUTION INTRAVENOUS at 07:55

## 2019-07-27 RX ADMIN — HEPARIN SODIUM SCH UNITS: 5000 INJECTION, SOLUTION INTRAVENOUS; SUBCUTANEOUS at 20:09

## 2019-07-27 NOTE — PDOC.EVN
Event Note





- Event Note


Event Note: 


POD4


No c/o this AM.


VSS


AF since 7/25 ~1900


Abdomen is soft and NT.


Cultures remain negative.


Plan: Cont. ABX x at least 48 hrs AF.

## 2019-07-28 VITALS — DIASTOLIC BLOOD PRESSURE: 66 MMHG | SYSTOLIC BLOOD PRESSURE: 112 MMHG | TEMPERATURE: 97.6 F

## 2019-07-28 LAB
ANION GAP SERPL CALC-SCNC: 8 MMOL/L (ref 10–20)
BUN SERPL-MCNC: (no result) MG/DL (ref 7–18.7)
CALCIUM SERPL-MCNC: 8.4 MG/DL (ref 7.8–10.44)
CHLORIDE SERPL-SCNC: 108 MMOL/L (ref 98–107)
CO2 SERPL-SCNC: 23 MMOL/L (ref 22–29)
CREAT CL PREDICTED SERPL C-G-VRATE: 135 ML/MIN (ref 70–130)
GLUCOSE SERPL-MCNC: 87 MG/DL (ref 70–105)
MAGNESIUM SERPL-MCNC: 2 MG/DL (ref 1.6–2.6)
POTASSIUM SERPL-SCNC: 3.4 MMOL/L (ref 3.5–5.1)
SODIUM SERPL-SCNC: 136 MMOL/L (ref 136–145)

## 2019-07-28 RX ADMIN — HEPARIN SODIUM SCH UNITS: 5000 INJECTION, SOLUTION INTRAVENOUS; SUBCUTANEOUS at 07:40

## 2019-07-28 RX ADMIN — FAMOTIDINE SCH MG: 10 INJECTION, SOLUTION INTRAVENOUS at 07:40

## 2019-07-28 NOTE — PDOC.EVN
Event Note





- Event Note


Event Note: 





DISCHARGE NOTE





Admit date: 7/23/19


Discharge date: 7/28/19





DX:


Septic AB


Obstetrical D&C








Course: This patient was admitted with Dr Chaves on call, as a 6-7 week septic 

AB. She underwent D&C on 7/23/19 with dr chaves. She was placed on zosyn 

antibiotics. Blood culture was negative to date. 





I evaluated the patient at bedside on 7/28/19 and found her stable for DC to 

home. Last temp was 7/25/19 in the PM.





She was tolerating po well.





Vitals reviewed pre-discharge planning.





I recommended she follow up at [facility] in 2 weeks.





As afebrile for greater than 48 hours and the infected contents were removed, 

no home antibiotics prescribed.

## 2024-01-18 ENCOUNTER — HOSPITAL ENCOUNTER (EMERGENCY)
Dept: HOSPITAL 92 - CSHERS | Age: 44
LOS: 1 days | Discharge: HOME | End: 2024-01-19
Payer: SELF-PAY

## 2024-01-18 DIAGNOSIS — N76.0: Primary | ICD-10-CM

## 2024-01-18 DIAGNOSIS — B96.89: ICD-10-CM

## 2024-01-18 DIAGNOSIS — N75.1: ICD-10-CM

## 2024-01-18 DIAGNOSIS — N39.0: ICD-10-CM

## 2024-01-18 LAB
ALBUMIN SERPL BCG-MCNC: 4.2 G/DL (ref 3.5–5)
ALP SERPL-CCNC: 87 U/L (ref 40–110)
ALT SERPL W P-5'-P-CCNC: 17 U/L (ref 8–55)
ANION GAP SERPL CALC-SCNC: 14 MMOL/L (ref 10–20)
AST SERPL-CCNC: 16 U/L (ref 5–34)
BASOPHILS # BLD AUTO: 0.1 10X3/UL (ref 0–0.2)
BASOPHILS NFR BLD AUTO: 0.4 % (ref 0–2)
BILIRUB SERPL-MCNC: 0.3 MG/DL (ref 0.2–1.2)
BUN SERPL-MCNC: 8 MG/DL (ref 7–18.7)
CALCIUM SERPL-MCNC: 8.7 MG/DL (ref 7.8–10.44)
CHLORIDE SERPL-SCNC: 105 MMOL/L (ref 98–107)
CO2 SERPL-SCNC: 23 MMOL/L (ref 22–29)
CREAT CL PREDICTED SERPL C-G-VRATE: 0 ML/MIN (ref 70–130)
EOSINOPHIL # BLD AUTO: 0.1 10X3/UL (ref 0–0.5)
EOSINOPHIL NFR BLD AUTO: 0.4 % (ref 0–6)
GLOBULIN SER CALC-MCNC: 3.6 G/DL (ref 2.4–3.5)
GLUCOSE SERPL-MCNC: 100 MG/DL (ref 70–105)
HCT VFR BLD CALC: 29.8 % (ref 34.9–44.5)
HGB BLD-MCNC: 9.3 G/DL (ref 12–15.5)
LEUKOCYTE ESTERASE UR QL STRIP.AUTO: 500
LYMPHOCYTES NFR BLD AUTO: 13.4 % (ref 18–47)
MCH RBC QN AUTO: 24.2 PG (ref 27–33)
MCV RBC AUTO: 77.6 FL (ref 81.6–98.3)
MONOCYTES # BLD AUTO: 1.3 10X3/UL (ref 0–1.1)
MONOCYTES NFR BLD AUTO: 9.2 % (ref 0–10)
NEUTROPHILS # BLD AUTO: 10.9 10X3/UL (ref 1.5–8.4)
NEUTROPHILS NFR BLD AUTO: 76.2 % (ref 40–75)
PLATELET # BLD AUTO: 334 10X3/UL (ref 150–450)
POTASSIUM SERPL-SCNC: 3.9 MMOL/L (ref 3.5–5.1)
PREGU CONTROL BACKGROUND?: (no result)
PREGU CONTROL BAR APPEAR?: YES
PROT UR STRIP.AUTO-MCNC: 30 MG/DL
RBC # BLD AUTO: 3.84 10X6/UL (ref 3.9–5.03)
SODIUM SERPL-SCNC: 138 MMOL/L (ref 136–145)
SP GR UR STRIP: 1.01 (ref 1–1.03)
WBC # BLD AUTO: 14.3 10X3/UL (ref 3.5–10.5)

## 2024-01-18 PROCEDURE — 87660 TRICHOMONAS VAGIN DIR PROBE: CPT

## 2024-01-18 PROCEDURE — 81001 URINALYSIS AUTO W/SCOPE: CPT

## 2024-01-18 PROCEDURE — 87510 GARDNER VAG DNA DIR PROBE: CPT

## 2024-01-18 PROCEDURE — 87491 CHLMYD TRACH DNA AMP PROBE: CPT

## 2024-01-18 PROCEDURE — 85025 COMPLETE CBC W/AUTO DIFF WBC: CPT

## 2024-01-18 PROCEDURE — 96375 TX/PRO/DX INJ NEW DRUG ADDON: CPT

## 2024-01-18 PROCEDURE — 96374 THER/PROPH/DIAG INJ IV PUSH: CPT

## 2024-01-18 PROCEDURE — 81025 URINE PREGNANCY TEST: CPT

## 2024-01-18 PROCEDURE — 87480 CANDIDA DNA DIR PROBE: CPT

## 2024-01-18 PROCEDURE — 87086 URINE CULTURE/COLONY COUNT: CPT

## 2024-01-18 PROCEDURE — 80053 COMPREHEN METABOLIC PANEL: CPT

## 2024-01-18 PROCEDURE — 87591 N.GONORRHOEAE DNA AMP PROB: CPT

## 2024-01-18 PROCEDURE — 76856 US EXAM PELVIC COMPLETE: CPT

## 2024-01-19 LAB
BACTERIA UR QL AUTO: (no result) HPF
CAUTI INDICATIONS FOR CULTURE: (no result)
RBC UR QL AUTO: (no result) HPF (ref 0–3)